# Patient Record
Sex: FEMALE | Race: WHITE | Employment: PART TIME | URBAN - METROPOLITAN AREA
[De-identification: names, ages, dates, MRNs, and addresses within clinical notes are randomized per-mention and may not be internally consistent; named-entity substitution may affect disease eponyms.]

---

## 2023-02-20 ENCOUNTER — INITIAL CONSULT (OUTPATIENT)
Dept: CARDIOLOGY CLINIC | Age: 23
End: 2023-02-20
Payer: COMMERCIAL

## 2023-02-20 VITALS
HEART RATE: 103 BPM | WEIGHT: 231 LBS | HEIGHT: 64 IN | DIASTOLIC BLOOD PRESSURE: 80 MMHG | SYSTOLIC BLOOD PRESSURE: 134 MMHG | BODY MASS INDEX: 39.44 KG/M2

## 2023-02-20 DIAGNOSIS — Z76.89 ENCOUNTER TO ESTABLISH CARE: Primary | ICD-10-CM

## 2023-02-20 DIAGNOSIS — R00.2 PALPITATIONS: ICD-10-CM

## 2023-02-20 DIAGNOSIS — R00.0 TACHYCARDIA: ICD-10-CM

## 2023-02-20 PROCEDURE — 93000 ELECTROCARDIOGRAM COMPLETE: CPT | Performed by: INTERNAL MEDICINE

## 2023-02-20 PROCEDURE — 99244 OFF/OP CNSLTJ NEW/EST MOD 40: CPT | Performed by: INTERNAL MEDICINE

## 2023-02-20 RX ORDER — DILTIAZEM HYDROCHLORIDE 120 MG/1
120 CAPSULE, COATED, EXTENDED RELEASE ORAL DAILY
Qty: 30 CAPSULE | Refills: 5 | Status: SHIPPED | OUTPATIENT
Start: 2023-02-20

## 2023-02-20 RX ORDER — LUMATEPERONE 42 MG/1
42 CAPSULE ORAL DAILY
COMMUNITY

## 2023-02-20 RX ORDER — PROPRANOLOL HYDROCHLORIDE 60 MG/1
60 TABLET ORAL 2 TIMES DAILY
COMMUNITY

## 2023-02-20 RX ORDER — PRAZOSIN HYDROCHLORIDE 1 MG/1
CAPSULE ORAL
COMMUNITY
Start: 2023-01-08

## 2023-02-20 RX ORDER — DESOGESTREL AND ETHINYL ESTRADIOL 0.15-0.03
KIT ORAL EVERY MORNING
COMMUNITY
Start: 2023-02-16

## 2023-02-20 RX ORDER — ALBUTEROL SULFATE 90 UG/1
AEROSOL, METERED RESPIRATORY (INHALATION)
COMMUNITY
Start: 2022-12-26

## 2023-02-20 RX ORDER — SERDEXMETHYLPHENIDATE AND DEXMETHYLPHENIDATE 7.8; 39.2 MG/1; MG/1
CAPSULE ORAL
COMMUNITY
Start: 2022-11-19

## 2023-02-20 RX ORDER — LORAZEPAM 1 MG/1
1 TABLET ORAL NIGHTLY PRN
COMMUNITY

## 2023-02-20 RX ORDER — TRAZODONE HYDROCHLORIDE 100 MG/1
TABLET ORAL NIGHTLY PRN
COMMUNITY
Start: 2023-02-03

## 2023-02-20 ASSESSMENT — ENCOUNTER SYMPTOMS
ALLERGIC/IMMUNOLOGIC NEGATIVE: 1
GASTROINTESTINAL NEGATIVE: 1
EYES NEGATIVE: 1
RESPIRATORY NEGATIVE: 1
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
## 2023-03-02 ENCOUNTER — TELEPHONE (OUTPATIENT)
Dept: CARDIOLOGY CLINIC | Age: 23
End: 2023-03-02

## 2023-03-02 NOTE — TELEPHONE ENCOUNTER
Attempted to submit Prior auth and received a message stating that they are unable to verify pt by ID number and to call member services on the back of pts card. Placed call to pt made her aware. She stated she will touch base with insurance company and follow up accordingly.

## 2023-03-07 ENCOUNTER — NURSE ONLY (OUTPATIENT)
Dept: CARDIOLOGY CLINIC | Age: 23
End: 2023-03-07

## 2023-03-07 DIAGNOSIS — R07.9 CHEST PAIN: Primary | ICD-10-CM

## 2023-03-07 NOTE — TELEPHONE ENCOUNTER
Spoke to pt made her aware. We successfully submitted PA and will follow up accordingly once response received. Pt verbalized understanding.

## 2023-03-08 ENCOUNTER — TELEPHONE (OUTPATIENT)
Dept: CARDIOLOGY CLINIC | Age: 23
End: 2023-03-08

## 2023-03-08 NOTE — TELEPHONE ENCOUNTER
Spoke to pt made her aware per CoverMyMeds the PA submitted was approved. Pt verbalized understanding.

## 2023-03-08 NOTE — TELEPHONE ENCOUNTER
----- Message from Levonne Cogan, MD sent at 3/8/2023 10:00 AM EST -----  Stable echo.   ----- Message -----  From: Karolina Reveles MD  Sent: 3/7/2023   5:37 PM EST  To: Levonne Cogan, MD

## 2023-03-16 ENCOUNTER — TELEPHONE (OUTPATIENT)
Dept: CARDIOLOGY CLINIC | Age: 23
End: 2023-03-16

## 2023-03-16 NOTE — TELEPHONE ENCOUNTER
Pt states that the propranolol with the Corlanor is making her feel sick and wants to know can she stop the propranolol

## 2023-03-16 NOTE — TELEPHONE ENCOUNTER
Since starting Corlanor 5 mg BID with propranolol 60 mg qd, HR usually in low 60s, but often drops to low 50s. When HR in low 50s,\"really not feeling well\" with increased SOB, dizziness, light headedness, faint feeling, and extreme fatigue. Occasional visual disturbances with \"light trails\" and double vision. Alysa Pack is helping. Continues prozosin 1 mg 3 tabs TID. Patient asks if she may wean off propranolol and asks for Dr. Shy Ravi recommendations.

## 2023-03-17 NOTE — TELEPHONE ENCOUNTER
Advised patient of Dr. Krista Tucker response. Patient verbalized understanding, but asks how to wean off propranolol. Advised patient to ask pharmacist for best way to wean off propranolol. Patient verbalized understanding.

## 2023-03-17 NOTE — TELEPHONE ENCOUNTER
MD Dung Rodriguez RN  Caller: Unspecified (Yesterday,  4:08 PM)  Yes would wean off/stop propranolol.            ;'

## 2023-03-26 ENCOUNTER — TELEPHONE (OUTPATIENT)
Dept: CARDIOLOGY | Age: 23
End: 2023-03-26

## 2023-03-27 ENCOUNTER — TELEPHONE (OUTPATIENT)
Dept: CARDIOLOGY CLINIC | Age: 23
End: 2023-03-27

## 2023-03-27 DIAGNOSIS — R06.00 NOCTURNAL DYSPNEA: ICD-10-CM

## 2023-03-27 DIAGNOSIS — R06.83 SNORING: Primary | ICD-10-CM

## 2023-03-27 NOTE — TELEPHONE ENCOUNTER
Pt states she has been having SOB. Worse when she lays down. Believes r/t corlanor. States started \"picking up\" since she started corlanor. States the past few nights, she wakes up gasping for air. Admits to snoring, \"sometimes. \" Denies witnessed apnea. Admits to \"mild sleep apnea\" after sleep study last year. Was referred for CPAP, but never followed up on it. No documentation in chart regarding sleep study. Also asking for short acting propranolol to be able to wean off of the 60 mg daily. Please advise recommendations.

## 2023-03-27 NOTE — TELEPHONE ENCOUNTER
Sean Szymanski MD  Minus Genaro RN  Caller: Unspecified (Today, 10:31 AM)  Definitely sounds like a sleep apnea issue and would get that sleep study. Please place referral thanks! Reviewed Dr. Mirian Palacio response. Pt states she's not sure her rx is over a year old. Suggest she discuss with sleep center when they call. Verb understanding.

## 2023-03-27 NOTE — TELEPHONE ENCOUNTER
Patient is having shortness of breath that she thinks is her corlanor causing. Also she was weaning off of one of her medications that is extended release and states we need to call in prescription for immediate release. Please advise.

## 2023-04-14 ENCOUNTER — TELEPHONE (OUTPATIENT)
Dept: CARDIOLOGY CLINIC | Age: 23
End: 2023-04-14

## 2023-04-17 NOTE — TELEPHONE ENCOUNTER
Advised pt of Dr. Eunice Venegas response. Pt verbalized understanding. Pt asked about how to wean off propanolol. She stated that when she spoke to the pharmacist they informed her that Dr. Jesse Gatica would have to advise her on how to wean off. Informed her I would send the message to Dr. Jesse Gatica and give her a call back with his response.  Pt verbalized understanding

## 2023-04-18 NOTE — TELEPHONE ENCOUNTER
Spoke with pt. Pt stated she was on capsule for of propranolol and had already cut back to once a day. Consulted with Liliana and instructed pt to take 1 cap every other day for 1-2 weeks and then she can discontinue. Also instructed pt to call our office if she has any issues. Pt verbalized understanding of the instructions given and had no further questions.

## 2023-05-23 ENCOUNTER — OFFICE VISIT (OUTPATIENT)
Age: 23
End: 2023-05-23

## 2023-05-23 VITALS
DIASTOLIC BLOOD PRESSURE: 82 MMHG | SYSTOLIC BLOOD PRESSURE: 112 MMHG | HEART RATE: 78 BPM | WEIGHT: 229 LBS | BODY MASS INDEX: 39.31 KG/M2

## 2023-05-23 DIAGNOSIS — R00.2 PALPITATIONS: Primary | ICD-10-CM

## 2023-05-23 PROCEDURE — 93000 ELECTROCARDIOGRAM COMPLETE: CPT | Performed by: INTERNAL MEDICINE

## 2023-05-23 PROCEDURE — 99214 OFFICE O/P EST MOD 30 MIN: CPT | Performed by: INTERNAL MEDICINE

## 2023-05-23 RX ORDER — OMEPRAZOLE 40 MG/1
CAPSULE, DELAYED RELEASE ORAL
COMMUNITY
Start: 2023-04-13

## 2023-05-23 RX ORDER — LEVOTHYROXINE SODIUM 0.03 MG/1
TABLET ORAL
COMMUNITY
Start: 2023-05-04

## 2023-05-23 RX ORDER — ESCITALOPRAM OXALATE 10 MG/1
1 TABLET ORAL DAILY
COMMUNITY
Start: 2023-03-28

## 2023-05-23 ASSESSMENT — ENCOUNTER SYMPTOMS
EYES NEGATIVE: 1
GASTROINTESTINAL NEGATIVE: 1
ALLERGIC/IMMUNOLOGIC NEGATIVE: 1
RESPIRATORY NEGATIVE: 1
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
## 2023-11-27 ENCOUNTER — OFFICE VISIT (OUTPATIENT)
Age: 23
End: 2023-11-27
Payer: COMMERCIAL

## 2023-11-27 VITALS
SYSTOLIC BLOOD PRESSURE: 118 MMHG | DIASTOLIC BLOOD PRESSURE: 78 MMHG | HEIGHT: 64 IN | WEIGHT: 256 LBS | HEART RATE: 81 BPM | BODY MASS INDEX: 43.71 KG/M2

## 2023-11-27 DIAGNOSIS — R00.0 TACHYCARDIA: ICD-10-CM

## 2023-11-27 DIAGNOSIS — R00.2 PALPITATIONS: Primary | ICD-10-CM

## 2023-11-27 PROCEDURE — G8484 FLU IMMUNIZE NO ADMIN: HCPCS | Performed by: INTERNAL MEDICINE

## 2023-11-27 PROCEDURE — 99214 OFFICE O/P EST MOD 30 MIN: CPT | Performed by: INTERNAL MEDICINE

## 2023-11-27 PROCEDURE — G8417 CALC BMI ABV UP PARAM F/U: HCPCS | Performed by: INTERNAL MEDICINE

## 2023-11-27 PROCEDURE — G8427 DOCREV CUR MEDS BY ELIG CLIN: HCPCS | Performed by: INTERNAL MEDICINE

## 2023-11-27 PROCEDURE — 93000 ELECTROCARDIOGRAM COMPLETE: CPT | Performed by: INTERNAL MEDICINE

## 2023-11-27 PROCEDURE — 1036F TOBACCO NON-USER: CPT | Performed by: INTERNAL MEDICINE

## 2023-11-27 ASSESSMENT — ENCOUNTER SYMPTOMS
ALLERGIC/IMMUNOLOGIC NEGATIVE: 1
EYES NEGATIVE: 1
GASTROINTESTINAL NEGATIVE: 1
RESPIRATORY NEGATIVE: 1

## 2023-11-27 NOTE — PROGRESS NOTES
lb)   03/07/23 104.8 kg (231 lb)     BP Readings from Last 3 Encounters:   11/27/23 118/78   05/23/23 112/82   03/07/23 108/70     Gen: Well appearing, well developed, no acute distress  Eyes: Pupils equal, round. Extraocular movements are intact  ENT: Oropharynx clear, no oral lesions, normal dentition  CV: S1S2, regular rate and rhythm, no murmurs, rubs or gallops, normal JVD, no carotid bruits, normal distal pulses, no CHIARA  Pulm: Clear to auscultation bilaterally, no accessory muscle uses, no wheezes or rales  GI: Soft, NT, ND, +BS  Neuro: Alert and oriented, nonfocal  Psych: Appropriate affect  Skin: Normal color and skin turgor  MSK: Normal muscle bulk and tone    Medical problems and test results were reviewed with the patient today. No results found for any visits on 11/27/23.

## 2023-12-14 ENCOUNTER — TELEPHONE (OUTPATIENT)
Age: 23
End: 2023-12-14

## 2023-12-14 NOTE — TELEPHONE ENCOUNTER
----- Message from Latanya Monge sent at 12/14/2023 12:00 PM EST -----  Regarding: Clearance Letter  Contact: [phone]  Hi there!! I just had an appointment with my psychiatrist, and she told me that she never received that clearance letter for Lithium that we discussed during my last appointment. I was wondering if that letter could be sent to me so I could forward it to her? Thank you so much!

## 2023-12-14 NOTE — TELEPHONE ENCOUNTER
Letter Derived per Dr Mateo Reardon last OV note and available via [employer] as the pt requested. Will send a [employer] message to her making her aware.

## 2024-02-06 ENCOUNTER — HOSPITAL ENCOUNTER (OUTPATIENT)
Dept: NUCLEAR MEDICINE | Age: 24
Discharge: HOME | End: 2024-02-09
Payer: COMMERCIAL

## 2024-02-06 DIAGNOSIS — R19.8 GAGGING EPISODE: ICD-10-CM

## 2024-02-06 PROCEDURE — 3430000000 HC RX DIAGNOSTIC RADIOPHARMACEUTICAL: Performed by: NURSE PRACTITIONER

## 2024-02-06 PROCEDURE — A9541 TC99M SULFUR COLLOID: HCPCS | Performed by: NURSE PRACTITIONER

## 2024-02-06 PROCEDURE — 78264 GASTRIC EMPTYING IMG STUDY: CPT

## 2024-02-06 RX ADMIN — Medication 1 MILLICURIE: at 08:55

## 2024-07-18 ENCOUNTER — OFFICE VISIT (OUTPATIENT)
Age: 24
End: 2024-07-18
Payer: COMMERCIAL

## 2024-07-18 VITALS
HEIGHT: 64 IN | BODY MASS INDEX: 45.07 KG/M2 | DIASTOLIC BLOOD PRESSURE: 78 MMHG | WEIGHT: 264 LBS | HEART RATE: 91 BPM | SYSTOLIC BLOOD PRESSURE: 120 MMHG

## 2024-07-18 DIAGNOSIS — R00.2 PALPITATIONS: Primary | ICD-10-CM

## 2024-07-18 DIAGNOSIS — R00.0 TACHYCARDIA: ICD-10-CM

## 2024-07-18 PROCEDURE — G8417 CALC BMI ABV UP PARAM F/U: HCPCS | Performed by: INTERNAL MEDICINE

## 2024-07-18 PROCEDURE — 99214 OFFICE O/P EST MOD 30 MIN: CPT | Performed by: INTERNAL MEDICINE

## 2024-07-18 PROCEDURE — 1036F TOBACCO NON-USER: CPT | Performed by: INTERNAL MEDICINE

## 2024-07-18 PROCEDURE — G8427 DOCREV CUR MEDS BY ELIG CLIN: HCPCS | Performed by: INTERNAL MEDICINE

## 2024-07-18 PROCEDURE — 93000 ELECTROCARDIOGRAM COMPLETE: CPT | Performed by: INTERNAL MEDICINE
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
## 2024-10-24 ENCOUNTER — HOSPITAL ENCOUNTER (EMERGENCY)
Age: 24
Discharge: HOME | End: 2024-10-24
Attending: EMERGENCY MEDICINE
Payer: COMMERCIAL

## 2024-10-24 VITALS
SYSTOLIC BLOOD PRESSURE: 134 MMHG | RESPIRATION RATE: 16 BRPM | DIASTOLIC BLOOD PRESSURE: 80 MMHG | HEART RATE: 97 BPM | TEMPERATURE: 98.3 F | OXYGEN SATURATION: 100 % | HEIGHT: 65 IN | BODY MASS INDEX: 43.15 KG/M2 | WEIGHT: 259 LBS

## 2024-10-24 DIAGNOSIS — I10 HYPERTENSION, UNSPECIFIED TYPE: Primary | ICD-10-CM

## 2024-10-24 DIAGNOSIS — R51.9 NONINTRACTABLE EPISODIC HEADACHE, UNSPECIFIED HEADACHE TYPE: ICD-10-CM

## 2024-10-24 LAB
ALBUMIN SERPL-MCNC: 4.4 G/DL (ref 3.5–5)
ALBUMIN/GLOB SERPL: 1.5 (ref 0.4–1.6)
ALP SERPL-CCNC: 76 U/L (ref 45–117)
ALT SERPL-CCNC: 14 U/L (ref 13–61)
ANION GAP SERPL CALC-SCNC: 16 MMOL/L (ref 9–18)
APPEARANCE UR: CLEAR
AST SERPL-CCNC: 16 U/L (ref 15–37)
BASOPHILS # BLD: 0 K/UL (ref 0–0.2)
BASOPHILS NFR BLD: 0 % (ref 0–2)
BILIRUB SERPL-MCNC: 0.2 MG/DL (ref 0.2–1.1)
BILIRUB UR QL: NEGATIVE
BUN SERPL-MCNC: 13 MG/DL (ref 6–23)
CALCIUM SERPL-MCNC: 9.6 MG/DL (ref 8.3–10.4)
CHLORIDE SERPL-SCNC: 103 MMOL/L (ref 98–107)
CO2 SERPL-SCNC: 21 MMOL/L (ref 21–32)
COLOR UR: YELLOW
CREAT SERPL-MCNC: 0.7 MG/DL (ref 0.6–1)
DIFFERENTIAL METHOD BLD: ABNORMAL
EOSINOPHIL # BLD: 0.1 K/UL (ref 0–0.8)
EOSINOPHIL NFR BLD: 1 % (ref 0.5–7.8)
ERYTHROCYTE [DISTWIDTH] IN BLOOD BY AUTOMATED COUNT: 13.2 % (ref 11.9–14.6)
GLOBULIN SER CALC-MCNC: 2.9 G/DL (ref 2.8–4.5)
GLUCOSE SERPL-MCNC: 127 MG/DL (ref 65–100)
GLUCOSE UR STRIP.AUTO-MCNC: NEGATIVE MG/DL
HCG UR QL: NEGATIVE
HCT VFR BLD AUTO: 39.5 % (ref 35.8–46.3)
HGB BLD-MCNC: 13 G/DL (ref 11.7–15.4)
HGB UR QL STRIP: NEGATIVE
IMM GRANULOCYTES # BLD AUTO: 0 K/UL (ref 0–0.5)
IMM GRANULOCYTES NFR BLD AUTO: 0 % (ref 0–5)
KETONES UR QL STRIP.AUTO: NEGATIVE MG/DL
LEUKOCYTE ESTERASE UR QL STRIP.AUTO: NEGATIVE
LYMPHOCYTES # BLD: 3.1 K/UL (ref 0.5–4.6)
LYMPHOCYTES NFR BLD: 26 % (ref 13–44)
MCH RBC QN AUTO: 26.6 PG (ref 26.1–32.9)
MCHC RBC AUTO-ENTMCNC: 32.9 G/DL (ref 31.4–35)
MCV RBC AUTO: 80.8 FL (ref 82–102)
MONOCYTES # BLD: 0.5 K/UL (ref 0.1–1.3)
MONOCYTES NFR BLD: 4 % (ref 4–12)
NEUTS SEG # BLD: 8.1 K/UL (ref 1.7–8.2)
NEUTS SEG NFR BLD: 69 % (ref 43–78)
NITRITE UR QL STRIP.AUTO: NEGATIVE
NRBC # BLD: 0 K/UL (ref 0–0.2)
PH UR STRIP: 6 (ref 5–9)
PLATELET # BLD AUTO: 319 K/UL (ref 150–450)
PMV BLD AUTO: 10.1 FL (ref 9.4–12.3)
POTASSIUM SERPL-SCNC: 3.6 MMOL/L (ref 3.5–5.1)
PROT SERPL-MCNC: 7.3 G/DL (ref 6.4–8.2)
PROT UR STRIP-MCNC: NEGATIVE MG/DL
RBC # BLD AUTO: 4.89 M/UL (ref 4.05–5.2)
SODIUM SERPL-SCNC: 140 MMOL/L (ref 133–143)
SP GR UR REFRACTOMETRY: 1.02 (ref 1–1.02)
TROPONIN T SERPL HS-MCNC: <6 NG/L (ref 0–14)
UROBILINOGEN UR QL STRIP.AUTO: 0.2 EU/DL (ref 0.2–1)
WBC # BLD AUTO: 11.8 K/UL (ref 4.3–11.1)

## 2024-10-24 PROCEDURE — 6360000002 HC RX W HCPCS: Performed by: EMERGENCY MEDICINE

## 2024-10-24 PROCEDURE — 85025 COMPLETE CBC W/AUTO DIFF WBC: CPT

## 2024-10-24 PROCEDURE — 93005 ELECTROCARDIOGRAM TRACING: CPT | Performed by: EMERGENCY MEDICINE

## 2024-10-24 PROCEDURE — 81025 URINE PREGNANCY TEST: CPT

## 2024-10-24 PROCEDURE — 84484 ASSAY OF TROPONIN QUANT: CPT

## 2024-10-24 PROCEDURE — 96374 THER/PROPH/DIAG INJ IV PUSH: CPT

## 2024-10-24 PROCEDURE — 6370000000 HC RX 637 (ALT 250 FOR IP): Performed by: EMERGENCY MEDICINE

## 2024-10-24 PROCEDURE — 99284 EMERGENCY DEPT VISIT MOD MDM: CPT

## 2024-10-24 PROCEDURE — 81003 URINALYSIS AUTO W/O SCOPE: CPT

## 2024-10-24 PROCEDURE — 80053 COMPREHEN METABOLIC PANEL: CPT

## 2024-10-24 RX ORDER — ONDANSETRON 4 MG/1
4 TABLET, ORALLY DISINTEGRATING ORAL ONCE
Status: DISCONTINUED | OUTPATIENT
Start: 2024-10-24 | End: 2024-10-24

## 2024-10-24 RX ORDER — LISINOPRIL 10 MG/1
10 TABLET ORAL DAILY
Qty: 30 TABLET | Refills: 0 | Status: SHIPPED | OUTPATIENT
Start: 2024-10-24

## 2024-10-24 RX ORDER — DROPERIDOL 2.5 MG/ML
0.62 INJECTION, SOLUTION INTRAMUSCULAR; INTRAVENOUS ONCE
Status: COMPLETED | OUTPATIENT
Start: 2024-10-24 | End: 2024-10-24

## 2024-10-24 RX ORDER — ONDANSETRON 4 MG/1
4 TABLET, ORALLY DISINTEGRATING ORAL EVERY 30 MIN PRN
Status: DISCONTINUED | OUTPATIENT
Start: 2024-10-24 | End: 2024-10-24 | Stop reason: HOSPADM

## 2024-10-24 RX ADMIN — ONDANSETRON 4 MG: 4 TABLET, ORALLY DISINTEGRATING ORAL at 18:01

## 2024-10-24 RX ADMIN — DROPERIDOL 0.62 MG: 2.5 INJECTION, SOLUTION INTRAMUSCULAR; INTRAVENOUS at 18:01

## 2024-10-24 ASSESSMENT — PAIN - FUNCTIONAL ASSESSMENT
PAIN_FUNCTIONAL_ASSESSMENT: NONE - DENIES PAIN
PAIN_FUNCTIONAL_ASSESSMENT: 0-10

## 2024-10-24 ASSESSMENT — PAIN SCALES - GENERAL: PAINLEVEL_OUTOF10: 5

## 2024-10-24 ASSESSMENT — PAIN DESCRIPTION - DESCRIPTORS: DESCRIPTORS: THROBBING

## 2024-10-24 ASSESSMENT — PAIN DESCRIPTION - LOCATION: LOCATION: HEAD

## 2024-10-24 NOTE — ED TRIAGE NOTES
Pt presents to ED c/o occipital and left temporal throbbing headache intermittently for the past week.  Tylenol provides some relief. States she checked her BP at home and was running high. Called her PCP and was instructed to come to ED. Pt speech normal. Denies weakness/numbness/blurred vision.

## 2024-10-24 NOTE — ED NOTES
Patient mobility status  with no difficulty. Provider aware     I have reviewed discharge instructions with the patient and fiance.  The patient and fiance verbalized understanding.    Patient left ED via Discharge Method: ambulatory to Home with Significant Other.    Opportunity for questions and clarification provided.     Patient given 1 scripts.

## 2024-10-25 LAB
EKG ATRIAL RATE: 90 BPM
EKG DIAGNOSIS: NORMAL
EKG P AXIS: 65 DEGREES
EKG P-R INTERVAL: 151 MS
EKG Q-T INTERVAL: 364 MS
EKG QRS DURATION: 81 MS
EKG QTC CALCULATION (BAZETT): 446 MS
EKG R AXIS: 47 DEGREES
EKG T AXIS: 44 DEGREES
EKG VENTRICULAR RATE: 90 BPM

## 2024-10-25 PROCEDURE — 93010 ELECTROCARDIOGRAM REPORT: CPT | Performed by: INTERNAL MEDICINE

## 2024-12-13 ENCOUNTER — HOSPITAL ENCOUNTER (OUTPATIENT)
Dept: CT IMAGING | Age: 24
Discharge: HOME | End: 2024-12-15
Payer: COMMERCIAL

## 2024-12-13 DIAGNOSIS — R82.90 ABNORMAL RESULT ON SCREENING URINE TEST: ICD-10-CM

## 2024-12-13 PROCEDURE — 74160 CT ABDOMEN W/CONTRAST: CPT

## 2024-12-13 PROCEDURE — 6360000004 HC RX CONTRAST MEDICATION: Performed by: NURSE PRACTITIONER

## 2024-12-13 RX ORDER — IOPAMIDOL 755 MG/ML
100 INJECTION, SOLUTION INTRAVASCULAR
Status: COMPLETED | OUTPATIENT
Start: 2024-12-13 | End: 2024-12-13

## 2024-12-13 RX ADMIN — IOPAMIDOL 100 ML: 755 INJECTION, SOLUTION INTRAVENOUS at 08:43

## 2024-12-27 ENCOUNTER — HOSPITAL ENCOUNTER (OUTPATIENT)
Dept: ULTRASOUND IMAGING | Age: 24
Discharge: HOME | End: 2024-12-29
Payer: COMMERCIAL

## 2024-12-27 DIAGNOSIS — H93.A9 PULSATILE TINNITUS: ICD-10-CM

## 2024-12-27 PROCEDURE — 93880 EXTRACRANIAL BILAT STUDY: CPT

## 2024-12-28 PROCEDURE — 93880 EXTRACRANIAL BILAT STUDY: CPT | Performed by: RADIOLOGY
